# Patient Record
Sex: MALE | Race: BLACK OR AFRICAN AMERICAN
[De-identification: names, ages, dates, MRNs, and addresses within clinical notes are randomized per-mention and may not be internally consistent; named-entity substitution may affect disease eponyms.]

---

## 2020-10-13 ENCOUNTER — HOSPITAL ENCOUNTER (EMERGENCY)
Dept: HOSPITAL 53 - M ED | Age: 20
Discharge: HOME | End: 2020-10-13
Payer: COMMERCIAL

## 2020-10-13 VITALS — SYSTOLIC BLOOD PRESSURE: 140 MMHG | DIASTOLIC BLOOD PRESSURE: 63 MMHG

## 2020-10-13 VITALS — BODY MASS INDEX: 29.88 KG/M2 | WEIGHT: 190.39 LBS | HEIGHT: 67 IN

## 2020-10-13 DIAGNOSIS — R31.9: Primary | ICD-10-CM

## 2020-10-13 DIAGNOSIS — R10.9: ICD-10-CM

## 2020-10-13 DIAGNOSIS — F17.200: ICD-10-CM

## 2020-10-13 DIAGNOSIS — R11.10: ICD-10-CM

## 2020-10-13 LAB
ALBUMIN SERPL BCG-MCNC: 4.2 GM/DL (ref 3.2–5.2)
ALT SERPL W P-5'-P-CCNC: 23 U/L (ref 12–78)
BASOPHILS # BLD AUTO: 0 10^3/UL (ref 0–0.2)
BASOPHILS NFR BLD AUTO: 0.2 % (ref 0–1)
BILIRUB CONJ SERPL-MCNC: < 0.1 MG/DL (ref 0–0.2)
BILIRUB SERPL-MCNC: 0.4 MG/DL (ref 0.2–1)
EOSINOPHIL # BLD AUTO: 0 10^3/UL (ref 0–0.5)
EOSINOPHIL NFR BLD AUTO: 0.5 % (ref 0–3)
HCT VFR BLD AUTO: 42 % (ref 42–52)
HGB BLD-MCNC: 13.1 G/DL (ref 13.5–17.5)
LIPASE SERPL-CCNC: 26 U/L (ref 73–393)
LYMPHOCYTES # BLD AUTO: 1.3 10^3/UL (ref 1.5–5)
LYMPHOCYTES NFR BLD AUTO: 15.7 % (ref 24–44)
MCH RBC QN AUTO: 25.7 PG (ref 27–33)
MCHC RBC AUTO-ENTMCNC: 31.2 G/DL (ref 32–36.5)
MCV RBC AUTO: 82.5 FL (ref 80–96)
MONOCYTES # BLD AUTO: 0.4 10^3/UL (ref 0–0.8)
MONOCYTES NFR BLD AUTO: 5 % (ref 0–5)
NEUTROPHILS # BLD AUTO: 6.4 10^3/UL (ref 1.5–8.5)
NEUTROPHILS NFR BLD AUTO: 78.2 % (ref 36–66)
PLATELET # BLD AUTO: 253 10^3/UL (ref 150–450)
PROT SERPL-MCNC: 7.5 GM/DL (ref 6.4–8.2)
RBC # BLD AUTO: 5.09 10^6/UL (ref 4.3–6.1)
WBC # BLD AUTO: 8.2 10^3/UL (ref 4–10)

## 2020-10-13 PROCEDURE — 83605 ASSAY OF LACTIC ACID: CPT

## 2020-10-13 PROCEDURE — 80076 HEPATIC FUNCTION PANEL: CPT

## 2020-10-13 PROCEDURE — 74177 CT ABD & PELVIS W/CONTRAST: CPT

## 2020-10-13 PROCEDURE — 99284 EMERGENCY DEPT VISIT MOD MDM: CPT

## 2020-10-13 PROCEDURE — 83690 ASSAY OF LIPASE: CPT

## 2020-10-13 PROCEDURE — 81001 URINALYSIS AUTO W/SCOPE: CPT

## 2020-10-13 PROCEDURE — 93041 RHYTHM ECG TRACING: CPT

## 2020-10-13 PROCEDURE — 85025 COMPLETE CBC W/AUTO DIFF WBC: CPT

## 2020-10-13 PROCEDURE — 80047 BASIC METABLC PNL IONIZED CA: CPT

## 2020-10-13 NOTE — REPVR
PROCEDURE INFORMATION: 

Exam: CT Abdomen And Pelvis With Contrast 

Exam date and time: 10/13/2020 7:54 AM 

Age: 20 years old 

Clinical indication: Abdominal pain; Localized; Right lower quadrant (rlq); 

Additional info: Right lower quandrant pain 



TECHNIQUE: 

Imaging protocol: Computed tomography of the abdomen and pelvis with 

intravenous contrast. 

Radiation optimization: All CT scans at this facility use at least one of these 

dose optimization techniques: automated exposure control; mA and/or kV 

adjustment per patient size (includes targeted exams where dose is matched to 

clinical indication); or iterative reconstruction. 

Contrast material: ISOVUE 370; Contrast volume: 100 ml; Contrast route: 

INTRAVENOUS (IV);  



COMPARISON: 

No relevant prior studies available. 



FINDINGS: 

Pleural space: No acute airspace or pleural disease. 



Liver: No focal hepatic mass. 

Gallbladder and bile ducts: No cholelithiasis or biliary ductal dilatation. 

Pancreas: No pancreatic mass or ductal dilatation. 

Spleen: No splenomegaly. 

Adrenals: Unremarkable adrenals. 

Kidneys and ureters: Normal renal morphology. No hydronephrosis. 

Stomach and bowel: No significant small bowel dilatation. Mild colonic wall 

thickening and scattered diverticula, without pericolonic inflammation. 

Appendix: No acute appendicitis. 

Intraperitoneal space: No significant free fluid. 

Vasculature: Normal caliber of the abdominal aorta. 

Lymph nodes: Subcentimeter lymph nodes. 

Urinary bladder: Mild wall thickening in the nondistended bladder. 

Reproductive: Unremarkable as visualized. 

Bones/joints: No acute osseous pathology .

Soft tissues: Small umbilical hernia. 



IMPRESSION: 

1. Mild wall thickening in the nondistended bladder. 

2. Mild colonic wall thickening and scattered diverticula, without pericolonic 

inflammation. 

3. Additional findings as described above. 



Electronically signed by: Joseph Lofton On 10/13/2020  08:13:36 AM